# Patient Record
Sex: FEMALE | Race: WHITE | Employment: FULL TIME | URBAN - METROPOLITAN AREA
[De-identification: names, ages, dates, MRNs, and addresses within clinical notes are randomized per-mention and may not be internally consistent; named-entity substitution may affect disease eponyms.]

---

## 2019-11-25 ENCOUNTER — APPOINTMENT (OUTPATIENT)
Dept: CT IMAGING | Facility: CLINIC | Age: 54
End: 2019-11-25
Attending: FAMILY MEDICINE
Payer: COMMERCIAL

## 2019-11-25 ENCOUNTER — HOSPITAL ENCOUNTER (OUTPATIENT)
Facility: CLINIC | Age: 54
Setting detail: OBSERVATION
Discharge: OTHER INSTITUTION | End: 2019-11-26
Attending: FAMILY MEDICINE | Admitting: FAMILY MEDICINE
Payer: COMMERCIAL

## 2019-11-25 DIAGNOSIS — N13.2 HYDRONEPHROSIS WITH URINARY OBSTRUCTION DUE TO URETERAL CALCULUS: ICD-10-CM

## 2019-11-25 DIAGNOSIS — N23 RENAL COLIC: ICD-10-CM

## 2019-11-25 PROBLEM — E03.9 ACQUIRED HYPOTHYROIDISM: Status: ACTIVE | Noted: 2019-11-25

## 2019-11-25 PROBLEM — N20.1 LEFT URETERAL STONE: Status: ACTIVE | Noted: 2019-11-25

## 2019-11-25 PROBLEM — I10 BENIGN ESSENTIAL HYPERTENSION: Status: ACTIVE | Noted: 2019-11-25

## 2019-11-25 LAB
ALBUMIN UR-MCNC: NEGATIVE MG/DL
ANION GAP SERPL CALCULATED.3IONS-SCNC: 6 MMOL/L (ref 3–14)
APPEARANCE UR: ABNORMAL
BACTERIA #/AREA URNS HPF: ABNORMAL /HPF
BASOPHILS # BLD AUTO: 0.1 10E9/L (ref 0–0.2)
BASOPHILS NFR BLD AUTO: 0.3 %
BILIRUB UR QL STRIP: NEGATIVE
BUN SERPL-MCNC: 20 MG/DL (ref 7–30)
CALCIUM SERPL-MCNC: 9.3 MG/DL (ref 8.5–10.1)
CHLORIDE SERPL-SCNC: 103 MMOL/L (ref 94–109)
CO2 SERPL-SCNC: 30 MMOL/L (ref 20–32)
COLOR UR AUTO: YELLOW
CREAT SERPL-MCNC: 0.77 MG/DL (ref 0.52–1.04)
DIFFERENTIAL METHOD BLD: ABNORMAL
EOSINOPHIL NFR BLD AUTO: 3.6 %
ERYTHROCYTE [DISTWIDTH] IN BLOOD BY AUTOMATED COUNT: 13.3 % (ref 10–15)
GFR SERPL CREATININE-BSD FRML MDRD: 89 ML/MIN/{1.73_M2}
GLUCOSE SERPL-MCNC: 156 MG/DL (ref 70–99)
GLUCOSE UR STRIP-MCNC: NEGATIVE MG/DL
HCG UR QL: NEGATIVE
HCT VFR BLD AUTO: 39.8 % (ref 35–47)
HGB BLD-MCNC: 13.3 G/DL (ref 11.7–15.7)
HGB UR QL STRIP: ABNORMAL
IMM GRANULOCYTES # BLD: 0.1 10E9/L (ref 0–0.4)
IMM GRANULOCYTES NFR BLD: 0.5 %
KETONES UR STRIP-MCNC: NEGATIVE MG/DL
LEUKOCYTE ESTERASE UR QL STRIP: NEGATIVE
LYMPHOCYTES # BLD AUTO: 3.4 10E9/L (ref 0.8–5.3)
LYMPHOCYTES NFR BLD AUTO: 22.2 %
MCH RBC QN AUTO: 27.8 PG (ref 26.5–33)
MCHC RBC AUTO-ENTMCNC: 33.4 G/DL (ref 31.5–36.5)
MCV RBC AUTO: 83 FL (ref 78–100)
MONOCYTES # BLD AUTO: 1 10E9/L (ref 0–1.3)
MONOCYTES NFR BLD AUTO: 6.7 %
MUCOUS THREADS #/AREA URNS LPF: PRESENT /LPF
NEUTROPHILS # BLD AUTO: 10.2 10E9/L (ref 1.6–8.3)
NEUTROPHILS NFR BLD AUTO: 66.7 %
NITRATE UR QL: NEGATIVE
NRBC # BLD AUTO: 0 10*3/UL
NRBC BLD AUTO-RTO: 0 /100
PH UR STRIP: 5 PH (ref 5–7)
PLATELET # BLD AUTO: 340 10E9/L (ref 150–450)
POTASSIUM SERPL-SCNC: 3.4 MMOL/L (ref 3.4–5.3)
RBC # BLD AUTO: 4.79 10E12/L (ref 3.8–5.2)
RBC #/AREA URNS AUTO: >182 /HPF (ref 0–2)
SODIUM SERPL-SCNC: 139 MMOL/L (ref 133–144)
SOURCE: ABNORMAL
SP GR UR STRIP: 1.03 (ref 1–1.03)
SQUAMOUS #/AREA URNS AUTO: 10 /HPF (ref 0–1)
UROBILINOGEN UR STRIP-MCNC: 0 MG/DL (ref 0–2)
WBC # BLD AUTO: 15.3 10E9/L (ref 4–11)
WBC #/AREA URNS AUTO: 8 /HPF (ref 0–5)

## 2019-11-25 PROCEDURE — 25800030 ZZH RX IP 258 OP 636: Performed by: FAMILY MEDICINE

## 2019-11-25 PROCEDURE — 85025 COMPLETE CBC W/AUTO DIFF WBC: CPT | Performed by: FAMILY MEDICINE

## 2019-11-25 PROCEDURE — 96376 TX/PRO/DX INJ SAME DRUG ADON: CPT

## 2019-11-25 PROCEDURE — 96375 TX/PRO/DX INJ NEW DRUG ADDON: CPT

## 2019-11-25 PROCEDURE — 96361 HYDRATE IV INFUSION ADD-ON: CPT

## 2019-11-25 PROCEDURE — 99285 EMERGENCY DEPT VISIT HI MDM: CPT | Mod: 25

## 2019-11-25 PROCEDURE — 81001 URINALYSIS AUTO W/SCOPE: CPT | Performed by: FAMILY MEDICINE

## 2019-11-25 PROCEDURE — 25000128 H RX IP 250 OP 636: Performed by: FAMILY MEDICINE

## 2019-11-25 PROCEDURE — 99219 ZZC INITIAL OBSERVATION CARE,LEVL II: CPT | Performed by: FAMILY MEDICINE

## 2019-11-25 PROCEDURE — 74176 CT ABD & PELVIS W/O CONTRAST: CPT

## 2019-11-25 PROCEDURE — 99285 EMERGENCY DEPT VISIT HI MDM: CPT | Mod: 25 | Performed by: FAMILY MEDICINE

## 2019-11-25 PROCEDURE — 96365 THER/PROPH/DIAG IV INF INIT: CPT

## 2019-11-25 PROCEDURE — 80048 BASIC METABOLIC PNL TOTAL CA: CPT | Performed by: FAMILY MEDICINE

## 2019-11-25 PROCEDURE — 81025 URINE PREGNANCY TEST: CPT | Performed by: FAMILY MEDICINE

## 2019-11-25 PROCEDURE — 87086 URINE CULTURE/COLONY COUNT: CPT | Performed by: FAMILY MEDICINE

## 2019-11-25 RX ORDER — HYDROCHLOROTHIAZIDE 25 MG/1
25 TABLET ORAL
COMMUNITY
Start: 2019-09-13

## 2019-11-25 RX ORDER — KETOROLAC TROMETHAMINE 30 MG/ML
30 INJECTION, SOLUTION INTRAMUSCULAR; INTRAVENOUS ONCE
Status: COMPLETED | OUTPATIENT
Start: 2019-11-25 | End: 2019-11-25

## 2019-11-25 RX ORDER — HYDROMORPHONE HYDROCHLORIDE 1 MG/ML
0.5 INJECTION, SOLUTION INTRAMUSCULAR; INTRAVENOUS; SUBCUTANEOUS
Status: COMPLETED | OUTPATIENT
Start: 2019-11-25 | End: 2019-11-25

## 2019-11-25 RX ORDER — CEFTRIAXONE 1 G/1
1 INJECTION, POWDER, FOR SOLUTION INTRAMUSCULAR; INTRAVENOUS ONCE
Status: COMPLETED | OUTPATIENT
Start: 2019-11-25 | End: 2019-11-25

## 2019-11-25 RX ORDER — LEVOTHYROXINE SODIUM 125 UG/1
125 TABLET ORAL
COMMUNITY
Start: 2019-09-13

## 2019-11-25 RX ORDER — ONDANSETRON 2 MG/ML
4 INJECTION INTRAMUSCULAR; INTRAVENOUS ONCE
Status: COMPLETED | OUTPATIENT
Start: 2019-11-25 | End: 2019-11-25

## 2019-11-25 RX ORDER — VALACYCLOVIR HYDROCHLORIDE 1 G/1
TABLET, FILM COATED ORAL
COMMUNITY
Start: 2019-06-21

## 2019-11-25 RX ADMIN — CEFTRIAXONE SODIUM 1 G: 1 INJECTION, POWDER, FOR SOLUTION INTRAMUSCULAR; INTRAVENOUS at 23:32

## 2019-11-25 RX ADMIN — KETOROLAC TROMETHAMINE 30 MG: 30 INJECTION, SOLUTION INTRAMUSCULAR at 21:19

## 2019-11-25 RX ADMIN — HYDROMORPHONE HYDROCHLORIDE 0.5 MG: 1 INJECTION, SOLUTION INTRAMUSCULAR; INTRAVENOUS; SUBCUTANEOUS at 21:57

## 2019-11-25 RX ADMIN — ONDANSETRON 4 MG: 2 INJECTION INTRAMUSCULAR; INTRAVENOUS at 21:18

## 2019-11-25 RX ADMIN — SODIUM CHLORIDE 1000 ML: 9 INJECTION, SOLUTION INTRAVENOUS at 21:57

## 2019-11-25 RX ADMIN — HYDROMORPHONE HYDROCHLORIDE 0.5 MG: 1 INJECTION, SOLUTION INTRAMUSCULAR; INTRAVENOUS; SUBCUTANEOUS at 21:27

## 2019-11-25 RX ADMIN — HYDROMORPHONE HYDROCHLORIDE 0.5 MG: 1 INJECTION, SOLUTION INTRAMUSCULAR; INTRAVENOUS; SUBCUTANEOUS at 23:06

## 2019-11-26 ENCOUNTER — TELEPHONE (OUTPATIENT)
Dept: UROLOGY | Facility: CLINIC | Age: 54
End: 2019-11-26

## 2019-11-26 ENCOUNTER — PREP FOR PROCEDURE (OUTPATIENT)
Dept: UROLOGY | Facility: CLINIC | Age: 54
End: 2019-11-26

## 2019-11-26 VITALS
BODY MASS INDEX: 37.47 KG/M2 | SYSTOLIC BLOOD PRESSURE: 130 MMHG | RESPIRATION RATE: 18 BRPM | DIASTOLIC BLOOD PRESSURE: 50 MMHG | OXYGEN SATURATION: 95 % | HEIGHT: 64 IN | TEMPERATURE: 97.9 F | HEART RATE: 89 BPM | WEIGHT: 219.5 LBS

## 2019-11-26 DIAGNOSIS — N20.1 URETERAL STONE: Primary | ICD-10-CM

## 2019-11-26 LAB
ANION GAP SERPL CALCULATED.3IONS-SCNC: 4 MMOL/L (ref 3–14)
BUN SERPL-MCNC: 16 MG/DL (ref 7–30)
CALCIUM SERPL-MCNC: 8 MG/DL (ref 8.5–10.1)
CHLORIDE SERPL-SCNC: 103 MMOL/L (ref 94–109)
CO2 SERPL-SCNC: 29 MMOL/L (ref 20–32)
CREAT SERPL-MCNC: 0.86 MG/DL (ref 0.52–1.04)
ERYTHROCYTE [DISTWIDTH] IN BLOOD BY AUTOMATED COUNT: 13.4 % (ref 10–15)
GFR SERPL CREATININE-BSD FRML MDRD: 77 ML/MIN/{1.73_M2}
GLUCOSE SERPL-MCNC: 125 MG/DL (ref 70–99)
HCT VFR BLD AUTO: 39.2 % (ref 35–47)
HGB BLD-MCNC: 12.9 G/DL (ref 11.7–15.7)
INR PPP: 1 (ref 0.86–1.14)
MCH RBC QN AUTO: 27.9 PG (ref 26.5–33)
MCHC RBC AUTO-ENTMCNC: 32.9 G/DL (ref 31.5–36.5)
MCV RBC AUTO: 85 FL (ref 78–100)
PLATELET # BLD AUTO: 323 10E9/L (ref 150–450)
POTASSIUM SERPL-SCNC: 3.7 MMOL/L (ref 3.4–5.3)
RBC # BLD AUTO: 4.62 10E12/L (ref 3.8–5.2)
SODIUM SERPL-SCNC: 136 MMOL/L (ref 133–144)
WBC # BLD AUTO: 14.3 10E9/L (ref 4–11)

## 2019-11-26 PROCEDURE — 25800030 ZZH RX IP 258 OP 636: Performed by: FAMILY MEDICINE

## 2019-11-26 PROCEDURE — 25000128 H RX IP 250 OP 636: Performed by: FAMILY MEDICINE

## 2019-11-26 PROCEDURE — 96376 TX/PRO/DX INJ SAME DRUG ADON: CPT

## 2019-11-26 PROCEDURE — 99217 ZZC OBSERVATION CARE DISCHARGE: CPT | Performed by: FAMILY MEDICINE

## 2019-11-26 PROCEDURE — 36415 COLL VENOUS BLD VENIPUNCTURE: CPT | Performed by: FAMILY MEDICINE

## 2019-11-26 PROCEDURE — 85610 PROTHROMBIN TIME: CPT | Performed by: FAMILY MEDICINE

## 2019-11-26 PROCEDURE — G0378 HOSPITAL OBSERVATION PER HR: HCPCS

## 2019-11-26 PROCEDURE — 85027 COMPLETE CBC AUTOMATED: CPT | Performed by: FAMILY MEDICINE

## 2019-11-26 PROCEDURE — 96375 TX/PRO/DX INJ NEW DRUG ADDON: CPT

## 2019-11-26 PROCEDURE — 80048 BASIC METABOLIC PNL TOTAL CA: CPT | Performed by: FAMILY MEDICINE

## 2019-11-26 RX ORDER — KETOROLAC TROMETHAMINE 30 MG/ML
30 INJECTION, SOLUTION INTRAMUSCULAR; INTRAVENOUS EVERY 6 HOURS PRN
Status: DISCONTINUED | OUTPATIENT
Start: 2019-11-26 | End: 2019-11-26 | Stop reason: HOSPADM

## 2019-11-26 RX ORDER — ACETAMINOPHEN 650 MG/1
650 SUPPOSITORY RECTAL EVERY 4 HOURS PRN
Status: DISCONTINUED | OUTPATIENT
Start: 2019-11-26 | End: 2019-11-26 | Stop reason: HOSPADM

## 2019-11-26 RX ORDER — LIDOCAINE 40 MG/G
CREAM TOPICAL
Status: DISCONTINUED | OUTPATIENT
Start: 2019-11-26 | End: 2019-11-26 | Stop reason: HOSPADM

## 2019-11-26 RX ORDER — PROCHLORPERAZINE 25 MG
25 SUPPOSITORY, RECTAL RECTAL EVERY 12 HOURS PRN
Status: DISCONTINUED | OUTPATIENT
Start: 2019-11-26 | End: 2019-11-26 | Stop reason: HOSPADM

## 2019-11-26 RX ORDER — ONDANSETRON 2 MG/ML
4 INJECTION INTRAMUSCULAR; INTRAVENOUS EVERY 6 HOURS PRN
Status: DISCONTINUED | OUTPATIENT
Start: 2019-11-26 | End: 2019-11-26 | Stop reason: HOSPADM

## 2019-11-26 RX ORDER — HYDROMORPHONE HYDROCHLORIDE 1 MG/ML
0.3 INJECTION, SOLUTION INTRAMUSCULAR; INTRAVENOUS; SUBCUTANEOUS
Status: DISCONTINUED | OUTPATIENT
Start: 2019-11-26 | End: 2019-11-26 | Stop reason: HOSPADM

## 2019-11-26 RX ORDER — CEFAZOLIN SODIUM 1 G
1 VIAL (EA) INJECTION SEE ADMIN INSTRUCTIONS
Status: CANCELLED | OUTPATIENT
Start: 2019-11-26

## 2019-11-26 RX ORDER — OXYCODONE HYDROCHLORIDE 5 MG/1
5-10 TABLET ORAL
Status: DISCONTINUED | OUTPATIENT
Start: 2019-11-26 | End: 2019-11-26 | Stop reason: HOSPADM

## 2019-11-26 RX ORDER — SODIUM CHLORIDE 9 MG/ML
INJECTION, SOLUTION INTRAVENOUS CONTINUOUS
Status: DISCONTINUED | OUTPATIENT
Start: 2019-11-26 | End: 2019-11-26 | Stop reason: HOSPADM

## 2019-11-26 RX ORDER — ONDANSETRON 4 MG/1
4 TABLET, ORALLY DISINTEGRATING ORAL EVERY 6 HOURS PRN
Status: DISCONTINUED | OUTPATIENT
Start: 2019-11-26 | End: 2019-11-26 | Stop reason: HOSPADM

## 2019-11-26 RX ORDER — PROCHLORPERAZINE MALEATE 5 MG
10 TABLET ORAL EVERY 6 HOURS PRN
Status: DISCONTINUED | OUTPATIENT
Start: 2019-11-26 | End: 2019-11-26 | Stop reason: HOSPADM

## 2019-11-26 RX ORDER — ACETAMINOPHEN 325 MG/1
650 TABLET ORAL EVERY 4 HOURS PRN
Status: DISCONTINUED | OUTPATIENT
Start: 2019-11-26 | End: 2019-11-26 | Stop reason: HOSPADM

## 2019-11-26 RX ORDER — NALOXONE HYDROCHLORIDE 0.4 MG/ML
.1-.4 INJECTION, SOLUTION INTRAMUSCULAR; INTRAVENOUS; SUBCUTANEOUS
Status: DISCONTINUED | OUTPATIENT
Start: 2019-11-26 | End: 2019-11-26 | Stop reason: HOSPADM

## 2019-11-26 RX ADMIN — PROCHLORPERAZINE EDISYLATE 10 MG: 5 INJECTION INTRAMUSCULAR; INTRAVENOUS at 06:52

## 2019-11-26 RX ADMIN — HYDROMORPHONE HYDROCHLORIDE 0.3 MG: 1 INJECTION, SOLUTION INTRAMUSCULAR; INTRAVENOUS; SUBCUTANEOUS at 09:06

## 2019-11-26 RX ADMIN — HYDROMORPHONE HYDROCHLORIDE 0.3 MG: 1 INJECTION, SOLUTION INTRAMUSCULAR; INTRAVENOUS; SUBCUTANEOUS at 02:10

## 2019-11-26 RX ADMIN — ONDANSETRON 4 MG: 2 INJECTION INTRAMUSCULAR; INTRAVENOUS at 02:15

## 2019-11-26 RX ADMIN — KETOROLAC TROMETHAMINE 30 MG: 30 INJECTION, SOLUTION INTRAMUSCULAR at 05:19

## 2019-11-26 RX ADMIN — SODIUM CHLORIDE: 9 INJECTION, SOLUTION INTRAVENOUS at 01:10

## 2019-11-26 RX ADMIN — HYDROMORPHONE HYDROCHLORIDE 0.3 MG: 1 INJECTION, SOLUTION INTRAMUSCULAR; INTRAVENOUS; SUBCUTANEOUS at 06:48

## 2019-11-26 ASSESSMENT — MIFFLIN-ST. JEOR: SCORE: 1590.65

## 2019-11-26 NOTE — PLAN OF CARE
S-(situation): Patient discharged to [facility] via car with son.    B-(background): Observation goals met patient has had some relief with prn Dilaudid.    A-(assessment): Continues with left sided flank pain from kidney stone. Plan to transfer to [facility] for procedure.    R-(recommendations): Discharge instructions reviewed with patient. Listed belongings gathered and returned to patient.  Patient Education resolved: Yes  New medications-Pt. Has been educated about reason of use and side effects NA  Home and hospital acquired medications returned to patient NA  Medication Bin checked and emptied on discharge Yes

## 2019-11-26 NOTE — PROGRESS NOTES
S-(situation): Patient registered to Observation. Patient arrived to room 256 via wheelchair from ED.    B-(background): Kidney stones    A-(assessment): VSS, pain tolerable 2/10, a/o, LS clear.    R-(recommendations): Orders and observation goals reviewed with pt    Nursing Observation criteria listed below was met:    Skin issues/needs documented:NA  Isolation needs addressed, if appropriate: NA  Fall Prevention: Education given and documented: Yes  Education Assessment documented:Yes  Education Documented (Pre-existing chronic infection such as, MRSA/VRE need education on admission): Yes  OBS video/handout Reviewed & DocumentedYes  Medication Reconciliation Complete: Yes  New medication patient education completed and documented (Possible Side Effects of Common Medications handout): Yes  Home medications if not able to send immediately home with family stored here: NA  Reminder note placed in discharge instructions: NA  Patient has discharge needs (If yes, please explain): No

## 2019-11-26 NOTE — H&P
[facility]    History and Physical - Hospitalist Service       Date of Admission:  11/25/2019    Assessment & Plan   Nicolasa Zarate is a 54 year old female admitted on 11/25/2019. She presents with worsening left flank pain that started this afternoon.  Is been associate with nausea and vomiting.  No previous history of renal or kidney stones.  In the emergency department CT imaging is showing a 0.6 cm left proximal ureteral stone with moderate hydronephrosis.  This is been discussed with the on-call urologist, Dr. Avalos.  He would like her registered observation and monitor overnight for pain control.  If pain is controlled by a.m., can be discharged home on pain management, otherwise if still symptomatic, he will make arrangements for surgical intervention tomorrow afternoon at [facility].  He is to be notified in the a.m. if this needs to be done.  Meantime will be registered observation with IV fluids, pain management.  Patient is medically cleared for surgery and anesthesia.  Her son will be able to transport her to the other hospital if necessary    Left ureteral stone  Onset of urinary symptoms this morning with worsening left flank pain starting this afternoon  CT imaging showing a 0.6 cm left proximal ureteral stone with moderate obstruction  Case has been discussed with urology, Dr. Avalos,with the recommendation of observation stay, pain control  He wants antibiotics given and she was given IV Rocephin.  Will likely need to be discharged tomorrow with follow-up with urology later tomorrow for operative procedure  If pain controlled, can be discharged home tomorrow with outpatient follow-up.    Benign essential hypertension  Controlled at home taking hydrochlorothiazide  Hold for now    Acquired hypothyroidism  Taking daily Synthroid replacement  Restart at discharge         Diet: Advance as tolerated  DVT Prophylaxis: Low Risk/Ambulatory with no VTE prophylaxis  indicated  Jhaveri Catheter: not present  Code Status: Full code    Disposition Plan   Expected discharge: Tomorrow, recommended to prior living arrangement once adequate pain management/ tolerating PO medications and Plan for removal of stone determined.  Entered: Adrian Singletary MD 11/26/2019, 12:24 AM     The patient's care was discussed with the Patient.    Adrian Singletary MD  [facility]    ______________________________________________________________________    Chief Complaint   54-year-old female with increasing left flank pain    History is obtained from the patient, electronic health record and emergency department physician    History of Present Illness   Nicolasa Zarate is a 54 year old female who presents from home with increasing left flank pain.  Symptoms started in the morning with some urinary frequency and hesitancy which she assumed was a possible urinary tract infection by the afternoon was having increasing left flank pain associate with nausea and vomiting.  The pain is steadily worsened.  She denies any dysuria, no blood in the urine.  She denies any previous history of kidney stones.  Past history significant for hypertension taking daily hydrochlorothiazide and is taking daily Synthroid.  She denies any cough, shortness of breath, chest pain, fever, chills, sweats.    Review of Systems    The 10 point Review of Systems is negative other than noted in the HPI or here.     Past Medical History    I have reviewed this patient's medical history and updated it with pertinent information if needed.   Past Medical History:   Diagnosis Date     Hypertension        Past Surgical History   I have reviewed this patient's surgical history and updated it with pertinent information if needed.  Past Surgical History:   Procedure Laterality Date     COLONOSCOPY         Social History   I have reviewed this patient's social history and updated it with pertinent  information if needed.  Social History     Tobacco Use     Smoking status: Never Smoker     Smokeless tobacco: Never Used   Substance Use Topics     Alcohol use: Yes     Comment: socially     Drug use: Never       Family History   I have reviewed this patient's family history and updated it with pertinent information if needed.   Family History   Problem Relation Age of Onset     Diabetes Mother      Cerebrovascular Disease Mother         hx of stroke     Hypertension Mother      Prostate Cancer Father      Endometrial Cancer Maternal Grandmother        Prior to Admission Medications   Prior to Admission Medications   Prescriptions Last Dose Informant Patient Reported? Taking?   hydrochlorothiazide (HYDRODIURIL) 25 MG tablet 11/25/2019 at Unknown time  Yes Yes   Sig: Take 25 mg by mouth   levothyroxine (SYNTHROID/LEVOTHROID) 125 MCG tablet 11/25/2019 at Unknown time  Yes Yes   Sig: Take 125 mcg by mouth   valACYclovir (VALTREX) 1000 mg tablet More than a month at Unknown time  Yes No   Sig: TAKE ONE TABLET BY MOUTH TWICE DAILY FOR 3 DAYS FOR FLARES.      Facility-Administered Medications: None     Allergies   No Known Allergies    Physical Exam   Vital Signs: Temp: 97.8  F (36.6  C) Temp src: Oral BP: (!) 164/89 Pulse: 91   Resp: 18 SpO2: 98 % O2 Device: None (Room air)    Weight: 225 lbs 0 oz    Constitutional: awake, alert, cooperative, mild distress and appears stated age  Eyes: Lids and lashes normal, pupils equal, round and reactive to light, extra ocular muscles intact, sclera clear, conjunctiva normal  ENT: Normocephalic, without obvious abnormality, atraumatic, sinuses nontender on palpation, external ears without lesions, oral pharynx with moist mucous membranes, tonsils without erythema or exudates, gums normal and good dentition.  Hematologic / Lymphatic: no cervical lymphadenopathy and no supraclavicular lymphadenopathy  Respiratory: No increased work of breathing, good air exchange, clear to  auscultation bilaterally, no crackles or wheezing  Cardiovascular: regular rate and rhythm  GI: normal bowel sounds, soft, non-distended and non-tender  Skin: no bruising or bleeding, normal skin color, texture, turgor and no redness, warmth, or swelling  Musculoskeletal: There is no redness, warmth, or swelling of the joints.  Full range of motion noted.  Motor strength is 5 out of 5 all extremities bilaterally.  Tone is normal.  Neurologic: Awake, alert, oriented to name, place and time.  Cranial nerves II-XII are grossly intact.  Motor is 5 out of 5 bilaterally.  Cerebellar finger to nose, heel to shin intact.  Sensory is intact.  Babinski down going, Romberg negative, and gait is normal.    Data   Data reviewed today: I reviewed all medications, new labs and imaging results over the last 24 hours. I personally reviewed the abdominal CT image(s) showing 6 mm left ureteral stone which is proximal..    Results for orders placed or performed during the hospital encounter of 11/25/19 (from the past 24 hour(s))   UA reflex to Microscopic   Result Value Ref Range    Color Urine Yellow     Appearance Urine Slightly Cloudy     Glucose Urine Negative NEG^Negative mg/dL    Bilirubin Urine Negative NEG^Negative    Ketones Urine Negative NEG^Negative mg/dL    Specific Gravity Urine 1.030 1.003 - 1.035    Blood Urine Large (A) NEG^Negative    pH Urine 5.0 5.0 - 7.0 pH    Protein Albumin Urine Negative NEG^Negative mg/dL    Urobilinogen mg/dL 0.0 0.0 - 2.0 mg/dL    Nitrite Urine Negative NEG^Negative    Leukocyte Esterase Urine Negative NEG^Negative    Source Midstream Urine     RBC Urine >182 (H) 0 - 2 /HPF    WBC Urine 8 (H) 0 - 5 /HPF    Bacteria Urine Few (A) NEG^Negative /HPF    Squamous Epithelial /HPF Urine 10 (H) 0 - 1 /HPF    Mucous Urine Present (A) NEG^Negative /LPF   CBC with platelets differential   Result Value Ref Range    WBC 15.3 (H) 4.0 - 11.0 10e9/L    RBC Count 4.79 3.8 - 5.2 10e12/L    Hemoglobin 13.3 11.7  - 15.7 g/dL    Hematocrit 39.8 35.0 - 47.0 %    MCV 83 78 - 100 fl    MCH 27.8 26.5 - 33.0 pg    MCHC 33.4 31.5 - 36.5 g/dL    RDW 13.3 10.0 - 15.0 %    Platelet Count 340 150 - 450 10e9/L    Diff Method Automated Method     % Neutrophils 66.7 %    % Lymphocytes 22.2 %    % Monocytes 6.7 %    % Eosinophils 3.6 %    % Basophils 0.3 %    % Immature Granulocytes 0.5 %    Nucleated RBCs 0 0 /100    Absolute Neutrophil 10.2 (H) 1.6 - 8.3 10e9/L    Absolute Lymphocytes 3.4 0.8 - 5.3 10e9/L    Absolute Monocytes 1.0 0.0 - 1.3 10e9/L    Absolute Basophils 0.1 0.0 - 0.2 10e9/L    Abs Immature Granulocytes 0.1 0 - 0.4 10e9/L    Absolute Nucleated RBC 0.0    Basic metabolic panel   Result Value Ref Range    Sodium 139 133 - 144 mmol/L    Potassium 3.4 3.4 - 5.3 mmol/L    Chloride 103 94 - 109 mmol/L    Carbon Dioxide 30 20 - 32 mmol/L    Anion Gap 6 3 - 14 mmol/L    Glucose 156 (H) 70 - 99 mg/dL    Urea Nitrogen 20 7 - 30 mg/dL    Creatinine 0.77 0.52 - 1.04 mg/dL    GFR Estimate 89 >60 mL/min/[1.73_m2]    GFR Estimate If Black >90 >60 mL/min/[1.73_m2]    Calcium 9.3 8.5 - 10.1 mg/dL   HCG qualitative urine (UPT)   Result Value Ref Range    HCG Qual Urine Negative NEG^Negative   Abd/pelvis CT - no contrast - Stone Protocol    Narrative    EXAM: CT ABDOMEN AND PELVIS WITHOUT CONTRAST - RENAL STONE PROTOCOL  LOCATION: [facility]  DATE/TIME: 11/25/2019 10:00 PM    INDICATION: Left flank pain.  COMPARISON: None.    TECHNIQUE: CT scan of the abdomen and pelvis was performed without oral or IV contrast. Multiplanar reformats were obtained. Dose reduction techniques were used.  CONTRAST: None.    FINDINGS:    LOWER CHEST: Unremarkable.    HEPATOBILIARY: Mild diffuse fatty infiltration of the liver.    SPLEEN: Unremarkable.    PANCREAS: Unremarkable.    ADRENAL GLANDS: Unremarkable.    KIDNEYS/BLADDER: 0.6 cm calculus in the proximal ureter. Moderate dilatation of the intrarenal collecting system. An additional 0.3 cm  radiodensity in the inferior left hemipelvis (series 2 image 80) is likely within the distal left ureter, approximately   2 cm proximal to the ureterovesicular junction. Tiny 0.2 cm nonobstructing calculus in the interpolar region of the left kidney. Mild left perinephric haziness. No right renal or ureteral calculi.    BOWEL: Tiny hiatal hernia. The appendix is not visualized.    LYMPH NODES: Unremarkable.    PELVIC ORGANS: Bilateral fallopian tube occlusion devices are in place.    VASCULATURE: Atherosclerotic calcification in the abdominal aorta.    OTHER: Tiny periumbilical hernia containing fat.      Impression    IMPRESSION:   1. 0.6 cm proximal left ureteral calculus, resulting in moderate obstruction. There is also a probable tiny 0.3 cm calculus in the distal ureter.  2. Tiny nonobstructing left renal calculus.

## 2019-11-26 NOTE — TELEPHONE ENCOUNTER
Type of surgery: ureteroscopy with holmium lithotripsy and stent placement CPT code 68889, 05577  Ureteral stone [N20.1]  - Primary   Location of surgery: [facility]  Date and time of surgery: 11-26-19  12:00pm  Surgeon: Dr Avalos  Pre-Op Appt Date: na-in hospital   Post-Op Appt Date: tbd   Packet sent out: No  Pre-cert/Authorization completed:  No prior auth required per HP grid and list.   Date: 11/26/2019    Sent to  and was received. 11/26/2019    Insurance valid 11/26/2019

## 2019-11-26 NOTE — ED PROVIDER NOTES
[facility] ED Provider Note   Patient: Nicolasa Zarate  MRN #:  [id number]  Date of Visit: November 25, 2019    CC:     Chief Complaint   Patient presents with     Flank Pain     HPI:  Nicolasa Zarate is a 54 year old female who presented to the emergency department for flank pain. Patient reports having left sided flank pain and urinary urgency that started earlier this morning. She states her symptoms have progressively gotten worse throughout the day. She reports most of her pain is left lower abdominal, however, she is having left sided abdominal pain that wraps around to her left flank. She also notes having back pain and pain with breathing in. She states around 1730 she started having cramps. She states he pain is at an 8/10. She reports having no recent LMP, possibly going through menopause and no chance of pregnancy. She denies any history of kidney stones. She denies any past abdominal surgeries. She took Tylenol around 1300 today with no relief.    Problem List:  Patient Active Problem List    Diagnosis Date Noted     Left ureteral stone 11/25/2019     Priority: Medium     Benign essential hypertension 11/25/2019     Priority: Medium     Acquired hypothyroidism 11/25/2019     Priority: Medium       Past Medical History:   Diagnosis Date     Hypertension        MEDS: No current outpatient medications on file.      ALLERGIES:  No Known Allergies    Past Surgical History:   Procedure Laterality Date     COLONOSCOPY         Social History     Tobacco Use     Smoking status: Never Smoker     Smokeless tobacco: Never Used   Substance Use Topics     Alcohol use: Yes     Comment: socially     Drug use: Never         Review of Systems   Except as noted in HPI, all other systems were reviewed and are negative    Physical Exam     Vitals were reviewed  Patient Vitals for the past 12 hrs:   BP Temp Temp src Pulse Resp SpO2 Height Weight  "  11/26/19 0041 (!) 158/68 96.9  F (36.1  C) Oral 91 18 97 % 1.626 m (5' 4\") 99.6 kg (219 lb 8 oz)   11/26/19 0032 (!) 146/78 98.1  F (36.7  C) Oral 88 18 98 % -- --   11/25/19 2255 (!) 164/89 -- -- 91 -- -- -- 102.1 kg (225 lb)   11/25/19 2026 (!) 177/105 97.8  F (36.6  C) Oral 71 18 98 % -- --     GENERAL APPEARANCE: Alert, severe distress due to pain exam: Patient has pale color and is slightly diaphoretic  FACE: normal facies  EYES: Pupils are equal  HENT: normal external exam  NECK: no adenopathy or asymmetry  RESP: normal respiratory effort; clear breath sounds bilaterally  CV: regular rate and rhythm; no significant murmurs, gallops or rubs  ABD: soft, left sided abdominal tenderness; no significant left flank tenderness; no rebound or guarding; bowel sounds are normal  MS: no gross deformities noted; normal muscle tone.  EXT: No calf tenderness or pitting edema  SKIN: no worrisome rash  NEURO: no facial droop; no focal deficits, speech is normal        Available Lab/Imaging Results     Results for orders placed or performed during the hospital encounter of 11/25/19 (from the past 24 hour(s))   UA reflex to Microscopic   Result Value Ref Range    Color Urine Yellow     Appearance Urine Slightly Cloudy     Glucose Urine Negative NEG^Negative mg/dL    Bilirubin Urine Negative NEG^Negative    Ketones Urine Negative NEG^Negative mg/dL    Specific Gravity Urine 1.030 1.003 - 1.035    Blood Urine Large (A) NEG^Negative    pH Urine 5.0 5.0 - 7.0 pH    Protein Albumin Urine Negative NEG^Negative mg/dL    Urobilinogen mg/dL 0.0 0.0 - 2.0 mg/dL    Nitrite Urine Negative NEG^Negative    Leukocyte Esterase Urine Negative NEG^Negative    Source Midstream Urine     RBC Urine >182 (H) 0 - 2 /HPF    WBC Urine 8 (H) 0 - 5 /HPF    Bacteria Urine Few (A) NEG^Negative /HPF    Squamous Epithelial /HPF Urine 10 (H) 0 - 1 /HPF    Mucous Urine Present (A) NEG^Negative /LPF   CBC with platelets differential   Result Value Ref Range "    WBC 15.3 (H) 4.0 - 11.0 10e9/L    RBC Count 4.79 3.8 - 5.2 10e12/L    Hemoglobin 13.3 11.7 - 15.7 g/dL    Hematocrit 39.8 35.0 - 47.0 %    MCV 83 78 - 100 fl    MCH 27.8 26.5 - 33.0 pg    MCHC 33.4 31.5 - 36.5 g/dL    RDW 13.3 10.0 - 15.0 %    Platelet Count 340 150 - 450 10e9/L    Diff Method Automated Method     % Neutrophils 66.7 %    % Lymphocytes 22.2 %    % Monocytes 6.7 %    % Eosinophils 3.6 %    % Basophils 0.3 %    % Immature Granulocytes 0.5 %    Nucleated RBCs 0 0 /100    Absolute Neutrophil 10.2 (H) 1.6 - 8.3 10e9/L    Absolute Lymphocytes 3.4 0.8 - 5.3 10e9/L    Absolute Monocytes 1.0 0.0 - 1.3 10e9/L    Absolute Basophils 0.1 0.0 - 0.2 10e9/L    Abs Immature Granulocytes 0.1 0 - 0.4 10e9/L    Absolute Nucleated RBC 0.0    Basic metabolic panel   Result Value Ref Range    Sodium 139 133 - 144 mmol/L    Potassium 3.4 3.4 - 5.3 mmol/L    Chloride 103 94 - 109 mmol/L    Carbon Dioxide 30 20 - 32 mmol/L    Anion Gap 6 3 - 14 mmol/L    Glucose 156 (H) 70 - 99 mg/dL    Urea Nitrogen 20 7 - 30 mg/dL    Creatinine 0.77 0.52 - 1.04 mg/dL    GFR Estimate 89 >60 mL/min/[1.73_m2]    GFR Estimate If Black >90 >60 mL/min/[1.73_m2]    Calcium 9.3 8.5 - 10.1 mg/dL   HCG qualitative urine (UPT)   Result Value Ref Range    HCG Qual Urine Negative NEG^Negative   Abd/pelvis CT - no contrast - Stone Protocol    Narrative    EXAM: CT ABDOMEN AND PELVIS WITHOUT CONTRAST - RENAL STONE PROTOCOL  LOCATION: [facility]  DATE/TIME: 11/25/2019 10:00 PM    INDICATION: Left flank pain.  COMPARISON: None.    TECHNIQUE: CT scan of the abdomen and pelvis was performed without oral or IV contrast. Multiplanar reformats were obtained. Dose reduction techniques were used.  CONTRAST: None.    FINDINGS:    LOWER CHEST: Unremarkable.    HEPATOBILIARY: Mild diffuse fatty infiltration of the liver.    SPLEEN: Unremarkable.    PANCREAS: Unremarkable.    ADRENAL GLANDS: Unremarkable.    KIDNEYS/BLADDER: 0.6 cm calculus in the  proximal ureter. Moderate dilatation of the intrarenal collecting system. An additional 0.3 cm radiodensity in the inferior left hemipelvis (series 2 image 80) is likely within the distal left ureter, approximately   2 cm proximal to the ureterovesicular junction. Tiny 0.2 cm nonobstructing calculus in the interpolar region of the left kidney. Mild left perinephric haziness. No right renal or ureteral calculi.    BOWEL: Tiny hiatal hernia. The appendix is not visualized.    LYMPH NODES: Unremarkable.    PELVIC ORGANS: Bilateral fallopian tube occlusion devices are in place.    VASCULATURE: Atherosclerotic calcification in the abdominal aorta.    OTHER: Tiny paraumbilical hernia containing fat.      Impression    IMPRESSION:   1. 0.6 cm proximal left ureteral calculus, resulting in moderate obstruction. There is also a probable tiny 0.3 cm calculus in the distal ureter.  2. Tiny nonobstructing left renal calculus.              Impression     Final diagnoses:   Hydronephrosis with urinary obstruction due to ureteral calculus   Renal colic         ED Course & Medical Decision Making   Nicolasa Zarate is a 54 year old female who presented to the emergency department with acute onset of left-sided flank and abdominal pain.  Symptoms started this morning with urinary urgency, with progressive pain both in the left flank and in the left lower abdominal region.  She reports that she had a severe episode of pain, accompanied by vomiting.  She had to pull over on the side of the road and throw up.  Her current pain levels rated 8/10.  She has no prior history of kidney stones, but has had urinary tract infections, both bladder and kidney.  Patient was seen shortly after arrival.  Patient appeared in moderate to severe distress due to pain.  Temperature is 97.8, blood pressure 177/105, heart rate of 71, respiratory rate of 18 with 90% oxygen saturation.  Patient had left-sided abdominal and flank pain but no reproducible  pain with mild CVA palpation.  She does have some left lower quadrant tenderness without rebound or guarding.  Laboratory work-up reveals a elevated white blood count of 15.3 with 66% neutrophils.  Basic metabolic panel revealed a glucose of 156, BUN of 20 and creatinine of 0.77.  Electrolytes are normal.  Urinalysis reveals greater than 182 red blood cells, 8 white blood cells with few bacteria.  CT scan of the abdomen reveals a 6 mm proximal left ureteral calculus resulting in moderate obstruction.  There is also a probable tiny 0.3 cm calculus in the distal ureter.  There is tiny nonobstructing left renal calculus.  Patient received IV fluids, Toradol, Zofran, and Dilaudid for pain.  Her pain was still not well controlled.  I discussed the case with Dr. Joel Avalos.  He recommended that we hospitalize the patient for pain control overnight.  Contact him Tuesday morning to to provide an update.  If the patient's pain is controlled, she may be able to go home and follow-up on an outpatient basis.  Otherwise if she is still having a fair amount of symptoms, he will try to arrange for urologic procedure at [place] later in the day.  I conveyed my communication with Dr. Avalos to Dr. Singletary.  Patient was in agreement with this plan as well.           ED to Inpatient Handoff:    Discussed with Dr. Singletary  Patient accepted for Observation Stay  Pending studies include none  Code Status: Full Code                   This document serves as a record of services personally performed by Walker Addison MD. It was created on their behalf by Patricia Leonard, a trained medical scribe. The creation of this record is based on the provider's personal observations and the statements of the patient. This document has been checked and approved by the attending provider.       Disclaimer: This note consists of words and symbols derived from keyboarding and dictation using voice recognition software.  As a result, there may be errors that have  gone undetected.  Please consider this when interpreting information found in this note.      Molly Addison MD  11/26/19 1079

## 2019-11-26 NOTE — PLAN OF CARE
Pt a/o, c/o of left abdomen/flank pain, VSS, afebrile, LS clear, Toradol and dilaudid prn given for pain control.

## 2019-11-26 NOTE — PROGRESS NOTES
Patient discharged to [place] via car with son.  PIV remains intact on the right hand for use during the procedure.

## 2019-11-26 NOTE — ASSESSMENT & PLAN NOTE
Onset of urinary symptoms this morning with worsening left flank pain starting this afternoon  CT imaging showing a 0.6 cm left proximal ureteral stone with moderate obstruction  Case has been discussed with urology, Dr. Avalos,with the recommendation of observation stay, pain control  He wants antibiotics given and she was given IV Rocephin.  Will likely need to be discharged tomorrow with follow-up with urology later tomorrow for operative procedure  If pain controlled, can be discharged home tomorrow with outpatient follow-up.

## 2019-11-26 NOTE — DISCHARGE SUMMARY
[facility]  Hospitalist Discharge Summary       Date of Admission:  11/25/2019  Date of Discharge:  11/26/2019  Discharging Provider: Michell Gross MD  Discharge Diagnoses   Principal Problem:    Left ureteral stone  Active Problems:    Benign essential hypertension    Acquired hypothyroidism    Follow-ups Needed After Discharge   Follow-up Appointments     Follow Up and recommended labs and tests      Proceed directly to [facility] for your meeting and procedure   with Dr. Avalos           Unresulted Labs Ordered in the Past 30 Days of this Admission     Date and Time Order Name Status Description    11/25/2019 2040 Urine Culture In process       These results will be followed up by Dr. Avalos, urology    Discharge Disposition   Discharge patient and family will bring patient directly to [facility] for outpatient procedure with Dr. Avalos to be performed  Condition at discharge: Stable    Hospital Course   Nicolasa Zarate is a 54 year old female admitted on 11/25/2019. She presents with worsening left flank pain that started the day of admission and was associate with nausea and vomiting.  No previous history of renal or kidney stones.  In the emergency department CT imaging is showing a 0.6 cm left proximal ureteral stone with moderate hydronephrosis.  Following discussion with the on-call urologist, Dr. Avalos, patient was registered to observation and monitor overnight for pain control in hopes of achieving adequate pain control with PO medication which would allow outpatient follow up, however if pain was not able to be well controlled, would proceed with arrangements for surgical intervention at [facility] where he is performing procedures on 11/26/19.  Patient unfortunately continues to struggle with severe pain control and requires frequent IV Dilaudid to maintain tolerable pain control.  The pain has not changed is location or quality  since admission.  Discussed with Dr. Avalos again on 11/26/19 and he will be arranging appropriate surgical intervention at [facility] for this afternoon. Patient is medically cleared for surgery and anesthesia.  Her son will be able to transport her to the other hospital and home following the procedure.  She will remain here until the timing of the outpatient procedure is known, and then discharged with enough time to get to [facility] for that procedure to be performed.     Left ureteral stone  Onset of urinary symptoms on morning of presentation with worsening left flank pain starting hours before her presentation to the ED  CT imaging showing a 0.6 cm left proximal ureteral stone with moderate obstruction  Rocephin x1 has been given, urine culture is pending  Patient was registered to observation status with hopes of improving her pain control and allowing outpatient follow-up, however patient continues to require IV pain medications and antiemetics.  Dr. Avalos, urologist, is planning on performing outpatient surgical intervention on the patient this afternoon at [facility].    Patient will discharge to [facility] to allow for this procedure to occur.     Benign essential hypertension  Controlled at home taking hydrochlorothiazide - slightly elevated during this observation stay secondary to pain.  hydrochlorothiazide has been held during this observation stay but anticipate she could restart tomorrow following her outpatient procedure being complete.      Acquired hypothyroidism  Taking daily Synthroid replacement  Restart at discharge    Consultations This Hospital Stay   UROLOGY IP CONSULT    Code Status   Full Code    Time Spent on this Encounter   I, Michell Gross MD, personally saw the patient today and spent greater than 30 minutes discharging this patient.       Michell Gross MD  [facility]  ______________________________________________________________________    Physical Exam   Vital Signs: Temp: 97.9  F (36.6  C) Temp src: Oral BP: 130/50 Pulse: 89   Resp: 18 SpO2: 95 % O2 Device: None (Room air)    Weight: 219 lbs 8 oz  Constitutional: awake, alert, cooperative, no apparent distress at rest but patient received medication approximately an hour ago, and appears stated age  Respiratory: No increased work of breathing, good air exchange, clear to auscultation bilaterally, no crackles or wheezing  Cardiovascular: Normal apical impulse, regular rate and rhythm, normal S1 and S2, no S3 or S4, and no murmur noted  GI: bowel sounds present, abdomen soft, no significant tenderness on palpation of her abdomen - states the pain is deeper  Skin: no redness, warmth, or swelling and no rashes  Musculoskeletal: no lower extremity pitting edema present  Neurologic: Awake, alert, oriented to name, place and time.         Primary Care Physician   Luisa Ca    Discharge Orders      Reason for your hospital stay    Left sided kidney stone that is not passing on it's own.  You have been given extra fluids and pain medications overnight in hopes that the stone would start shifting and moving downward with signs of passing on it's own, but unfortunately that has not happened.  You are now going down to [facility] to see Dr. Avalos, urologist, and have an outpatient procedure performed to help the stone pass.     Follow Up and recommended labs and tests    Proceed directly to [facility] for your meeting and procedure with Dr. Avalos     Activity    Your activity upon discharge: activity as tolerated     Advance Diet as Tolerated    Follow this diet upon discharge: Nothing by mouth between now and your procedure       Significant Results and Procedures   Results for orders placed or performed during the hospital encounter of 11/25/19   Abd/pelvis CT - no contrast - Stone Protocol     Narrative    EXAM: CT ABDOMEN AND PELVIS WITHOUT CONTRAST - RENAL STONE PROTOCOL  LOCATION: [facility]  DATE/TIME: 11/25/2019 10:00 PM    INDICATION: Left flank pain.  COMPARISON: None.    TECHNIQUE: CT scan of the abdomen and pelvis was performed without oral or IV contrast. Multiplanar reformats were obtained. Dose reduction techniques were used.  CONTRAST: None.    FINDINGS:    LOWER CHEST: Unremarkable.    HEPATOBILIARY: Mild diffuse fatty infiltration of the liver.    SPLEEN: Unremarkable.    PANCREAS: Unremarkable.    ADRENAL GLANDS: Unremarkable.    KIDNEYS/BLADDER: 0.6 cm calculus in the proximal ureter. Moderate dilatation of the intrarenal collecting system. An additional 0.3 cm radiodensity in the inferior left hemipelvis (series 2 image 80) is likely within the distal left ureter, approximately   2 cm proximal to the ureterovesicular junction. Tiny 0.2 cm nonobstructing calculus in the interpolar region of the left kidney. Mild left perinephric haziness. No right renal or ureteral calculi.    BOWEL: Tiny hiatal hernia. The appendix is not visualized.    LYMPH NODES: Unremarkable.    PELVIC ORGANS: Bilateral fallopian tube occlusion devices are in place.    VASCULATURE: Atherosclerotic calcification in the abdominal aorta.    OTHER: Tiny paraumbilical hernia containing fat.      Impression    IMPRESSION:   1. 0.6 cm proximal left ureteral calculus, resulting in moderate obstruction. There is also a probable tiny 0.3 cm calculus in the distal ureter.  2. Tiny nonobstructing left renal calculus.       Discharge Medications   Current Discharge Medication List      CONTINUE these medications which have NOT CHANGED    Details   hydrochlorothiazide (HYDRODIURIL) 25 MG tablet Take 25 mg by mouth      levothyroxine (SYNTHROID/LEVOTHROID) 125 MCG tablet Take 125 mcg by mouth      valACYclovir (VALTREX) 1000 mg tablet TAKE ONE TABLET BY MOUTH TWICE DAILY FOR 3 DAYS FOR FLARES.           Allergies   No  Known Allergies

## 2019-11-26 NOTE — TELEPHONE ENCOUNTER
Reason for Call:  Other appointment    Detailed comments: Patient is scheduled for surgery 11-26-19. Need appointment for possible stent removal.    Phone Number Patient can be reached at: Home number on file [phone] (home)    Best Time: any    Can we leave a detailed message on this number? YES    Call taken on 11/26/2019 at 9:56 AM by Maida Mays

## 2019-11-26 NOTE — PROGRESS NOTES
SPIRITUAL HEALTH SERVICES  SPIRITUAL ASSESSMENT Progress Note  [facility]      During Rounding,  introduced himself to Annelise Zarate and informed her of his availability.    Ori Phan M.Div., [facility]  Staff   Office tel: [phone]

## 2019-11-27 ENCOUNTER — ANESTHESIA EVENT (OUTPATIENT)
Dept: SURGERY | Facility: AMBULATORY SURGERY CENTER | Age: 54
End: 2019-11-27

## 2019-11-27 ENCOUNTER — TELEPHONE (OUTPATIENT)
Dept: UROLOGY | Facility: CLINIC | Age: 54
End: 2019-11-27

## 2019-11-27 PROBLEM — N20.1 URETERAL STONE: Status: ACTIVE | Noted: 2019-11-27

## 2019-11-27 LAB
BACTERIA SPEC CULT: NORMAL
SPECIMEN SOURCE: NORMAL

## 2019-11-27 ASSESSMENT — MIFFLIN-ST. JEOR: SCORE: 1624

## 2019-11-27 NOTE — TELEPHONE ENCOUNTER
Reason for Call:  Other call back    Detailed comments: Patient was called to schedule procedure for lithotripsy. Patient returning call as she is confused as to what this procedure would be for as she has an appointment 12-4-19 for a stent removal in office. Please call patient to discuss. Thank you.    Phone Number Patient can be reached at: Home number on file [phone] (home)    Best Time: any     Can we leave a detailed message on this number? YES    Call taken on 11/27/2019 at 11:34 AM by Maida Mays

## 2019-11-27 NOTE — TELEPHONE ENCOUNTER
Called and spoke to patient.   Stent removal appointment cancelled.   Information provided to patient that additional surgery is required.   Patient agrees.   Elmira Alberts RN

## 2019-11-27 NOTE — TELEPHONE ENCOUNTER
Type of surgery: cystoureteroscopy,with lithotripsy using laser and ureteral stent insertion (left) CPT code 42101, 72811  Ureteral stone [N20.1]  - Primary   Location of surgery: [facility]  Date and time of surgery: 12-2-19   1:45pm  Surgeon: Dr Avalos  Pre-Op Appt Date: done in hospital  Post-Op Appt Date: TBD   Packet sent out: No  Pre-cert/Authorization completed:  No prior auth required per    Date: 11/27/2019    Insurance valid 12/02/2019

## 2019-11-27 NOTE — TELEPHONE ENCOUNTER
Reason for Call:  Other appointment    Detailed comments: Patient has been scheduled for a 2nd procedure 12-2-19 at the [facility]. May need appointment for stent removal. Please call patient to advise.    Phone Number Patient can be reached at: Home number on file [phone] (home)    Best Time: any    Can we leave a detailed message on this number? YES    Call taken on 11/27/2019 at 2:08 PM by Maida Mays

## 2019-12-02 ENCOUNTER — ANCILLARY PROCEDURE (OUTPATIENT)
Dept: GENERAL RADIOLOGY | Facility: CLINIC | Age: 54
End: 2019-12-02
Attending: UROLOGY
Payer: COMMERCIAL

## 2019-12-02 ENCOUNTER — ANESTHESIA (OUTPATIENT)
Dept: SURGERY | Facility: AMBULATORY SURGERY CENTER | Age: 54
End: 2019-12-02

## 2019-12-02 ENCOUNTER — HOSPITAL ENCOUNTER (OUTPATIENT)
Facility: AMBULATORY SURGERY CENTER | Age: 54
Discharge: HOME | End: 2019-12-02
Attending: UROLOGY | Admitting: UROLOGY
Payer: COMMERCIAL

## 2019-12-02 VITALS
RESPIRATION RATE: 18 BRPM | HEIGHT: 64 IN | BODY MASS INDEX: 38.73 KG/M2 | TEMPERATURE: 97 F | WEIGHT: 226.85 LBS | DIASTOLIC BLOOD PRESSURE: 100 MMHG | OXYGEN SATURATION: 99 % | SYSTOLIC BLOOD PRESSURE: 185 MMHG | HEART RATE: 71 BPM

## 2019-12-02 DIAGNOSIS — N20.0 RENAL CALCULI: ICD-10-CM

## 2019-12-02 DIAGNOSIS — N20.1 URETERAL STONE: ICD-10-CM

## 2019-12-02 PROCEDURE — 82365 CALCULUS SPECTROSCOPY: CPT | Performed by: PATHOLOGY

## 2019-12-02 PROCEDURE — G8918 PT W/O PREOP ORDER IV AB PRO: HCPCS

## 2019-12-02 PROCEDURE — G8916 PT W IV AB GIVEN ON TIME: HCPCS

## 2019-12-02 PROCEDURE — 99000 SPECIMEN HANDLING OFFICE-LAB: CPT | Performed by: UROLOGY

## 2019-12-02 PROCEDURE — 52356 CYSTO/URETERO W/LITHOTRIPSY: CPT | Mod: LT | Performed by: UROLOGY

## 2019-12-02 PROCEDURE — 52356 CYSTO/URETERO W/LITHOTRIPSY: CPT | Mod: LT

## 2019-12-02 PROCEDURE — G8907 PT DOC NO EVENTS ON DISCHARG: HCPCS

## 2019-12-02 DEVICE — IMPLANTABLE DEVICE: Type: IMPLANTABLE DEVICE | Site: URETER | Status: FUNCTIONAL

## 2019-12-02 RX ORDER — PROPOFOL 10 MG/ML
INJECTION, EMULSION INTRAVENOUS PRN
Status: DISCONTINUED | OUTPATIENT
Start: 2019-12-02 | End: 2019-12-02

## 2019-12-02 RX ORDER — ONDANSETRON 2 MG/ML
4 INJECTION INTRAMUSCULAR; INTRAVENOUS EVERY 30 MIN PRN
Status: DISCONTINUED | OUTPATIENT
Start: 2019-12-02 | End: 2019-12-03 | Stop reason: HOSPADM

## 2019-12-02 RX ORDER — NALOXONE HYDROCHLORIDE 0.4 MG/ML
.1-.4 INJECTION, SOLUTION INTRAMUSCULAR; INTRAVENOUS; SUBCUTANEOUS
Status: DISCONTINUED | OUTPATIENT
Start: 2019-12-02 | End: 2019-12-03 | Stop reason: HOSPADM

## 2019-12-02 RX ORDER — DEXAMETHASONE SODIUM PHOSPHATE 4 MG/ML
4 INJECTION, SOLUTION INTRA-ARTICULAR; INTRALESIONAL; INTRAMUSCULAR; INTRAVENOUS; SOFT TISSUE EVERY 10 MIN PRN
Status: DISCONTINUED | OUTPATIENT
Start: 2019-12-02 | End: 2019-12-03 | Stop reason: HOSPADM

## 2019-12-02 RX ORDER — CEFAZOLIN SODIUM 1 G/3ML
1 INJECTION, POWDER, FOR SOLUTION INTRAMUSCULAR; INTRAVENOUS SEE ADMIN INSTRUCTIONS
Status: DISCONTINUED | OUTPATIENT
Start: 2019-12-02 | End: 2019-12-03 | Stop reason: HOSPADM

## 2019-12-02 RX ORDER — ACETAMINOPHEN 325 MG/1
975 TABLET ORAL ONCE
Status: COMPLETED | OUTPATIENT
Start: 2019-12-02 | End: 2019-12-02

## 2019-12-02 RX ORDER — LIDOCAINE 40 MG/G
CREAM TOPICAL
Status: DISCONTINUED | OUTPATIENT
Start: 2019-12-02 | End: 2019-12-03 | Stop reason: HOSPADM

## 2019-12-02 RX ORDER — LIDOCAINE HYDROCHLORIDE 20 MG/ML
INJECTION, SOLUTION INFILTRATION; PERINEURAL PRN
Status: DISCONTINUED | OUTPATIENT
Start: 2019-12-02 | End: 2019-12-02

## 2019-12-02 RX ORDER — FENTANYL CITRATE 50 UG/ML
25-50 INJECTION, SOLUTION INTRAMUSCULAR; INTRAVENOUS
Status: DISCONTINUED | OUTPATIENT
Start: 2019-12-02 | End: 2019-12-03 | Stop reason: HOSPADM

## 2019-12-02 RX ORDER — FENTANYL CITRATE 50 UG/ML
INJECTION, SOLUTION INTRAMUSCULAR; INTRAVENOUS PRN
Status: DISCONTINUED | OUTPATIENT
Start: 2019-12-02 | End: 2019-12-02

## 2019-12-02 RX ORDER — SODIUM CHLORIDE, SODIUM LACTATE, POTASSIUM CHLORIDE, CALCIUM CHLORIDE 600; 310; 30; 20 MG/100ML; MG/100ML; MG/100ML; MG/100ML
INJECTION, SOLUTION INTRAVENOUS CONTINUOUS
Status: DISCONTINUED | OUTPATIENT
Start: 2019-12-02 | End: 2019-12-03 | Stop reason: HOSPADM

## 2019-12-02 RX ORDER — OXYCODONE HYDROCHLORIDE 5 MG/1
5-10 TABLET ORAL EVERY 4 HOURS PRN
Status: DISCONTINUED | OUTPATIENT
Start: 2019-12-02 | End: 2019-12-03 | Stop reason: HOSPADM

## 2019-12-02 RX ORDER — CEPHALEXIN 500 MG/1
500 CAPSULE ORAL 3 TIMES DAILY
Qty: 9 CAPSULE | Refills: 0 | Status: SHIPPED | OUTPATIENT
Start: 2019-12-02 | End: 2020-01-23

## 2019-12-02 RX ORDER — ONDANSETRON 2 MG/ML
INJECTION INTRAMUSCULAR; INTRAVENOUS PRN
Status: DISCONTINUED | OUTPATIENT
Start: 2019-12-02 | End: 2019-12-02

## 2019-12-02 RX ORDER — MEPERIDINE HYDROCHLORIDE 25 MG/ML
12.5 INJECTION INTRAMUSCULAR; INTRAVENOUS; SUBCUTANEOUS
Status: DISCONTINUED | OUTPATIENT
Start: 2019-12-02 | End: 2019-12-03 | Stop reason: HOSPADM

## 2019-12-02 RX ORDER — KETOROLAC TROMETHAMINE 30 MG/ML
30 INJECTION, SOLUTION INTRAMUSCULAR; INTRAVENOUS EVERY 6 HOURS PRN
Status: DISCONTINUED | OUTPATIENT
Start: 2019-12-02 | End: 2019-12-03 | Stop reason: HOSPADM

## 2019-12-02 RX ORDER — HYDROMORPHONE HYDROCHLORIDE 1 MG/ML
.3-.5 INJECTION, SOLUTION INTRAMUSCULAR; INTRAVENOUS; SUBCUTANEOUS EVERY 10 MIN PRN
Status: DISCONTINUED | OUTPATIENT
Start: 2019-12-02 | End: 2019-12-03 | Stop reason: HOSPADM

## 2019-12-02 RX ORDER — ONDANSETRON 4 MG/1
4 TABLET, ORALLY DISINTEGRATING ORAL EVERY 30 MIN PRN
Status: DISCONTINUED | OUTPATIENT
Start: 2019-12-02 | End: 2019-12-03 | Stop reason: HOSPADM

## 2019-12-02 RX ORDER — GABAPENTIN 300 MG/1
300 CAPSULE ORAL ONCE
Status: COMPLETED | OUTPATIENT
Start: 2019-12-02 | End: 2019-12-02

## 2019-12-02 RX ORDER — CEFAZOLIN SODIUM 2 G/100ML
2 INJECTION, SOLUTION INTRAVENOUS
Status: COMPLETED | OUTPATIENT
Start: 2019-12-02 | End: 2019-12-02

## 2019-12-02 RX ORDER — ALBUTEROL SULFATE 0.83 MG/ML
2.5 SOLUTION RESPIRATORY (INHALATION) EVERY 4 HOURS PRN
Status: DISCONTINUED | OUTPATIENT
Start: 2019-12-02 | End: 2019-12-03 | Stop reason: HOSPADM

## 2019-12-02 RX ADMIN — ACETAMINOPHEN 975 MG: 325 TABLET ORAL at 12:59

## 2019-12-02 RX ADMIN — CEFAZOLIN SODIUM 2 G: 2 INJECTION, SOLUTION INTRAVENOUS at 14:29

## 2019-12-02 RX ADMIN — LIDOCAINE HYDROCHLORIDE 60 MG: 20 INJECTION, SOLUTION INFILTRATION; PERINEURAL at 14:27

## 2019-12-02 RX ADMIN — FENTANYL CITRATE 25 MCG: 50 INJECTION, SOLUTION INTRAMUSCULAR; INTRAVENOUS at 14:43

## 2019-12-02 RX ADMIN — FENTANYL CITRATE 50 MCG: 50 INJECTION, SOLUTION INTRAMUSCULAR; INTRAVENOUS at 14:26

## 2019-12-02 RX ADMIN — PROPOFOL 200 MG: 10 INJECTION, EMULSION INTRAVENOUS at 14:27

## 2019-12-02 RX ADMIN — GABAPENTIN 300 MG: 300 CAPSULE ORAL at 12:59

## 2019-12-02 RX ADMIN — SODIUM CHLORIDE, SODIUM LACTATE, POTASSIUM CHLORIDE, CALCIUM CHLORIDE: 600; 310; 30; 20 INJECTION, SOLUTION INTRAVENOUS at 14:20

## 2019-12-02 RX ADMIN — OXYCODONE HYDROCHLORIDE 5 MG: 5 TABLET ORAL at 15:31

## 2019-12-02 RX ADMIN — ONDANSETRON 4 MG: 2 INJECTION INTRAMUSCULAR; INTRAVENOUS at 14:31

## 2019-12-02 NOTE — DISCHARGE INSTRUCTIONS
[facility]  Same-Day Surgery   Adult Discharge Orders & Instructions   For 24 hours after surgery  1. Get plenty of rest.  A responsible adult must stay with you for at least 24 hours after you leave the hospital.   2. Do not drive or use heavy equipment.  If you have weakness or tingling, don't drive or use heavy equipment until this feeling goes away.  3. Do not drink alcohol.  4. Avoid strenuous or risky activities.  Ask for help when climbing stairs.   5. You may feel lightheaded.  IF so, sit for a few minutes before standing.  Have someone help you get up.   6. If you have nausea (feel sick to your stomach): Drink only clear liquids such as apple juice, ginger ale, broth or 7-Up.  Rest may also help.  Be sure to drink enough fluids.  Move to a regular diet as you feel able.  7. You may have a slight fever. Call the doctor if your fever is over 100 F (37.7 C) (taken under the tongue) or lasts longer than 24 hours.  8. You may have a dry mouth, a sore throat, muscle aches or trouble sleeping.  These should go away after 24 hours.  9. Do not make important or legal decisions.   Call your doctor for any of the followin.  Signs of infection (fever, growing tenderness at the surgery site, a large amount of drainage or bleeding, severe pain, foul-smelling drainage, redness, swelling).    2. It has been over 8 to 10 hours since surgery and you are still not able to urinate (pass water).    3.  Headache for over 24 hours.      To contact Dr Avalos call:  [phone]    Tylenol was given at 1:00 pm.

## 2019-12-02 NOTE — ANESTHESIA CARE TRANSFER NOTE
Patient: Nicolasa Zarate    Procedure(s):  LEFT CYSTOURETEROSCOPY, WITH LITHOTRIPSY USING LASER AND URETERAL STENT REMOVAL AND INSERTION    Diagnosis: Ureteral stone [N20.1]  Diagnosis Additional Information: No value filed.    Anesthesia Type:   General     Note:  Airway :Nasal Cannula  Patient transferred to:PACU  Comments: Awake, comfortable, sats 99%< Report to RN.Handoff Report: Identifed the Patient, Identified the Reponsible Provider, Reviewed the pertinent medical history, Discussed the surgical course, Reviewed Intra-OP anesthesia mangement and issues during anesthesia, Set expectations for post-procedure period and Allowed opportunity for questions and acknowledgement of understanding      Vitals: (Last set prior to Anesthesia Care Transfer)    CRNA VITALS  12/2/2019 1439 - 12/2/2019 1509      12/2/2019             Resp Rate (observed):  (!) 2                Electronically Signed By: VALENTE Holley CRNA  December 2, 2019  3:09 PM

## 2019-12-02 NOTE — ANESTHESIA PREPROCEDURE EVALUATION
Anesthesia Pre-Procedure Evaluation    Patient: Nicolasa Zarate   MRN:     [id number] Gender:   female   Age:    54 year old :      1965        Preoperative Diagnosis: Ureteral stone [N20.1]   Procedure(s):  LEFT CYSTOURETEROSCOPY, WITH LITHOTRIPSY USING LASER AND URETERAL STENT INSERTION     Past Medical History:   Diagnosis Date     Hypertension       Past Surgical History:   Procedure Laterality Date     COLONOSCOPY            Anesthesia Evaluation     . Pt has had prior anesthetic. Type: MAC    No history of anesthetic complications          ROS/MED HX    ENT/Pulmonary:  - neg pulmonary ROS     Neurologic:  - neg neurologic ROS     Cardiovascular:     (+) hypertension----. : . . . :. .       METS/Exercise Tolerance:     Hematologic:  - neg hematologic  ROS       Musculoskeletal:  - neg musculoskeletal ROS       GI/Hepatic:  - neg GI/hepatic ROS       Renal/Genitourinary:  - ROS Renal section negative       Endo:     (+) thyroid problem hypothyroidism, .      Psychiatric:  - neg psychiatric ROS       Infectious Disease:  - neg infectious disease ROS       Malignancy:      - no malignancy   Other:    - neg other ROS                     PHYSICAL EXAM:   Mental Status/Neuro: A/A/O   Airway: Facies: Feasible  Mallampati: I  Mouth/Opening: Full  TM distance: > 6 cm  Neck ROM: Full   Respiratory: Auscultation: CTAB     Resp. Rate: Normal     Resp. Effort: Normal      CV: Rhythm: Regular  Rate: Age appropriate  Heart: Normal Sounds  Edema: None   Comments:      Dental: Normal Dentition                LABS:  CBC:   Lab Results   Component Value Date    WBC 14.3 (H) 2019    WBC 15.3 (H) 2019    HGB 12.9 2019    HGB 13.3 2019    HCT 39.2 2019    HCT 39.8 2019     2019     2019     BMP:   Lab Results   Component Value Date     2019     2019    POTASSIUM 3.7 2019    POTASSIUM 3.4 2019    CHLORIDE 103 2019     "CHLORIDE 103 11/25/2019    CO2 29 11/26/2019    CO2 30 11/25/2019    BUN 16 11/26/2019    BUN 20 11/25/2019    CR 0.86 11/26/2019    CR 0.77 11/25/2019     (H) 11/26/2019     (H) 11/25/2019     COAGS:   Lab Results   Component Value Date    INR 1.00 11/26/2019     POC:   Lab Results   Component Value Date    HCG Negative 11/25/2019     OTHER:   Lab Results   Component Value Date    [place] 8.0 (L) 11/26/2019        Preop Vitals    BP Readings from Last 3 Encounters:   12/02/19 (!) 146/94   11/26/19 130/50    Pulse Readings from Last 3 Encounters:   12/02/19 75   11/26/19 89      Resp Readings from Last 3 Encounters:   12/02/19 18   11/26/19 18    SpO2 Readings from Last 3 Encounters:   12/02/19 97%   11/26/19 95%      Temp Readings from Last 1 Encounters:   12/02/19 97.6  F (36.4  C) (Temporal)    Ht Readings from Last 1 Encounters:   11/27/19 1.626 m (5' 4\")      Wt Readings from Last 1 Encounters:   11/27/19 102.9 kg (226 lb 13.7 oz)    Estimated body mass index is 38.94 kg/m  as calculated from the following:    Height as of this encounter: 1.626 m (5' 4\").    Weight as of this encounter: 102.9 kg (226 lb 13.7 oz).     LDA:  Peripheral IV 11/25/19 Right Upper forearm (Active)   Number of days: 7       Peripheral IV 12/02/19 Right Hand (Active)   Site Assessment WDL 12/2/2019  1:09 PM   Line Status Infusing 12/2/2019  1:09 PM   Phlebitis Scale 0-->no symptoms 12/2/2019  1:09 PM   Infiltration Scale 0 12/2/2019  1:09 PM   Infiltration Site Treatment Method  None 12/2/2019  1:09 PM   Extravasation? No 12/2/2019  1:09 PM   Dressing Intervention New dressing  12/2/2019  1:09 PM   Number of days: 0       Airway - Adult/Peds laryngeal mask airway (Active)   Number of days: 0        Assessment:   ASA SCORE: 2    H&P: History and physical reviewed and following examination; no interval change.   Smoking Status:  Non-Smoker/Unknown   NPO Status: NPO Appropriate     Plan:   Anes. Type:  General   Pre-Medication: " None   Induction:  IV (Standard)   Airway: LMA   Access/Monitoring: PIV   Maintenance: Balanced     Postop Plan:   Postop Pain: Opioids  Postop Sedation/Airway: Not planned  Disposition: Outpatient     PONV Management:   Adult Risk Factors: Female, Non-Smoker, Postop Opioids   Prevention: Ondansetron, Dexamethasone     CONSENT: Direct conversation   Plan and risks discussed with: Patient   Blood Products: Consent Deferred (Minimal Blood Loss)                   Mario Rodríguez MD

## 2019-12-02 NOTE — BRIEF OP NOTE
[place]  Brief Operative Note    Pre-operative diagnosis: Left ureteral stones   Post-operative diagnosis same   Procedure: Procedure(s):  LEFT CYSTOURETEROSCOPY, WITH LITHOTRIPSY USING LASER AND URETERAL STENT REMOVAL AND INSERTION   Surgeon: Joel Avalos MD   Assistants(s): None   Anesthesia: General    Estimated blood loss: minimal                   Specimens: sent   Implants: None       Complications: None   Condition on discharge: Stable   Findings: 2 stones removed  See dictated operative report for full details

## 2019-12-02 NOTE — ANESTHESIA POSTPROCEDURE EVALUATION
Anesthesia POST Procedure Evaluation    Patient: Nicolasa Zarate   MRN:     [id number] Gender:   female   Age:    54 year old :      1965        Preoperative Diagnosis: Ureteral stone [N20.1]   Procedure(s):  LEFT CYSTOURETEROSCOPY, WITH LITHOTRIPSY USING LASER AND URETERAL STENT REMOVAL AND INSERTION   Postop Comments: No value filed.       Anesthesia Type:  Not documented  General    Reportable Event: NO     PAIN: Uncomplicated   Sign Out status: Comfortable, Well controlled pain     PONV: No PONV   Sign Out status:  No Nausea or Vomiting     Neuro/Psych: Uneventful perioperative course   Sign Out Status: Preoperative baseline; Age appropriate mentation     Airway/Resp.: Uneventful perioperative course   Sign Out Status: Non labored breathing, age appropriate RR; Resp. Status within EXPECTED Parameters     CV: Uneventful perioperative course   Sign Out status: Appropriate BP and perfusion indices; Appropriate HR/Rhythm     Disposition:   Sign Out in:  PACU  Disposition:  Phase II; Home  Recovery Course: Uneventful  Follow-Up: Not required           Last Anesthesia Record Vitals:  CRNA VITALS  2019 1439 - 2019 1539      2019             Resp Rate (observed):  (!) 2          Last PACU Vitals:  Vitals Value Taken Time   /101 2019  3:10 PM   Temp 96.9  F (36.1  C) 2019  3:05 PM   Pulse 78 2019  3:10 PM   Resp 12 2019  3:13 PM   SpO2 100 % 2019  3:13 PM   Temp src     NIBP     Pulse     SpO2     Resp     Temp     Ht Rate     Temp 2     Vitals shown include unvalidated device data.      Electronically Signed By: Mario Rodríguez MD, 2019, 3:51 PM

## 2019-12-02 NOTE — OP NOTE
Preop diagnosis: Left ureteral stones    Postop diagnosis: Same    Procedure done:    1.  Left ureteroscopy with holmium laser lithotripsy and stone basketing  2. cystoscopy and left stent exchange    Procedure    Patient brought to the open room and placed in a dorsolithotomy position after general anesthesia has been induced.  She was draped and prepped in usual surgical fashion.  Preop antibiotic was given.  A rigid cystoscope was placed into the bladder.  The left stent was identified and removed with a grasper.  A [employer] wire was introduced into the renal pelvis.  A semirigid ureteroscope then used next.  This was introduced into the left ureter where a 4 mm stone was identified and removed with a basket.  Scope was then advanced to the upper ureter where another stone was identified.  Using a holmium laser the stone was broken down pieces and removed with a tipless basket.  A new stent 6 [place] by 24 cm stent was then placed.  Patient tolerated procedure well.  No complications identified during the procedure.  There was minimal bleeding during the operation.

## 2019-12-05 LAB
APPEARANCE STONE: NORMAL
COMPN STONE: NORMAL
NUMBER STONE: NORMAL
SIZE STONE: NORMAL MM
WT STONE: 69 MG

## 2019-12-11 ENCOUNTER — ANCILLARY PROCEDURE (OUTPATIENT)
Dept: GENERAL RADIOLOGY | Facility: OTHER | Age: 54
End: 2019-12-11
Attending: UROLOGY
Payer: COMMERCIAL

## 2019-12-11 ENCOUNTER — OFFICE VISIT (OUTPATIENT)
Dept: UROLOGY | Facility: OTHER | Age: 54
End: 2019-12-11
Payer: COMMERCIAL

## 2019-12-11 VITALS
TEMPERATURE: 98.2 F | DIASTOLIC BLOOD PRESSURE: 84 MMHG | BODY MASS INDEX: 38.79 KG/M2 | SYSTOLIC BLOOD PRESSURE: 132 MMHG | HEART RATE: 76 BPM | WEIGHT: 226 LBS

## 2019-12-11 DIAGNOSIS — N20.0 RENAL CALCULI: Primary | ICD-10-CM

## 2019-12-11 DIAGNOSIS — N20.0 RENAL CALCULI: ICD-10-CM

## 2019-12-11 PROCEDURE — 52310 CYSTOSCOPY AND TREATMENT: CPT | Performed by: UROLOGY

## 2019-12-11 PROCEDURE — 74019 RADEX ABDOMEN 2 VIEWS: CPT

## 2019-12-11 NOTE — PROGRESS NOTES
Patient returns to clinic for cysto and stent removal.  she is s/p ureteroscopy recently.       Procedure: patient was brought to the cysto suite.  she was draped and prepped in the usual surgical fashion.  Cystoscopy placed. Stent removed without problems.    Recommendation:  Return to Clinic: urorisks next

## 2019-12-16 ENCOUNTER — TRANSFERRED RECORDS (OUTPATIENT)
Dept: HEALTH INFORMATION MANAGEMENT | Facility: CLINIC | Age: 54
End: 2019-12-16

## 2019-12-16 DIAGNOSIS — N20.0 RENAL CALCULI: ICD-10-CM

## 2019-12-16 PROCEDURE — 83986 ASSAY PH BODY FLUID NOS: CPT | Mod: 90 | Performed by: UROLOGY

## 2019-12-16 PROCEDURE — 82570 ASSAY OF URINE CREATININE: CPT | Mod: 90 | Performed by: UROLOGY

## 2019-12-16 PROCEDURE — 83945 ASSAY OF OXALATE: CPT | Mod: 90 | Performed by: UROLOGY

## 2019-12-16 PROCEDURE — 84540 ASSAY OF URINE/UREA-N: CPT | Mod: 90 | Performed by: UROLOGY

## 2019-12-16 PROCEDURE — 84560 ASSAY OF URINE/URIC ACID: CPT | Mod: 90 | Performed by: UROLOGY

## 2019-12-16 PROCEDURE — 83735 ASSAY OF MAGNESIUM: CPT | Mod: 90 | Performed by: UROLOGY

## 2019-12-16 PROCEDURE — 82507 ASSAY OF CITRATE: CPT | Mod: 90 | Performed by: UROLOGY

## 2019-12-16 PROCEDURE — 83935 ASSAY OF URINE OSMOLALITY: CPT | Mod: 90 | Performed by: UROLOGY

## 2019-12-16 PROCEDURE — 82340 ASSAY OF CALCIUM IN URINE: CPT | Mod: 90 | Performed by: UROLOGY

## 2019-12-16 PROCEDURE — 84133 ASSAY OF URINE POTASSIUM: CPT | Mod: 90 | Performed by: UROLOGY

## 2019-12-16 PROCEDURE — 82140 ASSAY OF AMMONIA: CPT | Mod: 90 | Performed by: UROLOGY

## 2019-12-16 PROCEDURE — 84392 ASSAY OF URINE SULFATE: CPT | Mod: 90 | Performed by: UROLOGY

## 2019-12-16 PROCEDURE — 84300 ASSAY OF URINE SODIUM: CPT | Mod: 90 | Performed by: UROLOGY

## 2019-12-16 PROCEDURE — 84105 ASSAY OF URINE PHOSPHORUS: CPT | Mod: 90 | Performed by: UROLOGY

## 2019-12-16 PROCEDURE — 99000 SPECIMEN HANDLING OFFICE-LAB: CPT | Performed by: UROLOGY

## 2019-12-16 PROCEDURE — 82436 ASSAY OF URINE CHLORIDE: CPT | Mod: 90 | Performed by: UROLOGY

## 2019-12-18 LAB
CA H2 PHOS DIHYD 24H SATFR UR: ABNORMAL DG
CHLORIDE 24H UR-SRATE: 130 MMOL/24 H (ref 40–224)
CITRATE 24H UR-SRATE: 794 MG/24 H (ref 378–1191)
CLINICAL BIOCHEMIST REVIEW: ABNORMAL
COLLECT DURATION TIME UR: 0.46 DG
COLLECT DURATION TIME UR: 24 H
CREAT 24H UR-MRATE: 1701 MG/24 H
OSMOLALITY UR: 364 MOSM/KG (ref 150–1150)
OXALATE 24H UR-MRATE: 26.4 MG/24 H (ref 9.7–40.5)
OXALATE 24H UR-SRATE: 0.3 MMOL/24 H (ref 0.11–0.46)
PH 24H UR: 5.6 [PH] (ref 4.5–8)
PHOSPHATE 24H UR-MRATE: 1161 MG/24 H
POTASSIUM 24H UR-SRATE: 65 MMOL/24 H (ref 17–77)
SODIUM 24H UR-SRATE: 154 MMOL/24 H (ref 41–227)
SPECIMEN VOL 24H UR: 2700 ML
SULFATE 24H UR-SRATE: 29 MMOL/24 H (ref 7–47)
SUPERSAT 24 AMMONIUM 24 HR UR: 50 MMOL/24 H (ref 15–56)
SUPERSAT 24 CALCIUM OXALATE CRYSTAL: ABNORMAL DG
SUPERSAT 24 CALCIUM URINE: 81 MG/24 H
SUPERSAT 24 HYDROXYAPATITE CRYSTAL: 0.08 DG
SUPERSAT 24 MAGNESIUM URINE: 108 MG/24 H (ref 51–269)
SUPERSAT 24 PROTEIN CATABOLIC RATE UR: 114 G/24 H (ref 56–125)
SUPERSAT 24 UREA NITROGEN UR: 14.3 G/24 H (ref 5–16)
URATE 24H SATFR UR: 2.47 DG
URATE 24H UR-MRATE: 891 MG/24 H

## 2020-01-23 ENCOUNTER — OFFICE VISIT (OUTPATIENT)
Dept: UROLOGY | Facility: OTHER | Age: 55
End: 2020-01-23
Payer: COMMERCIAL

## 2020-01-23 VITALS
OXYGEN SATURATION: 98 % | SYSTOLIC BLOOD PRESSURE: 135 MMHG | DIASTOLIC BLOOD PRESSURE: 86 MMHG | TEMPERATURE: 97.9 F | RESPIRATION RATE: 16 BRPM | HEART RATE: 68 BPM

## 2020-01-23 DIAGNOSIS — N20.0 RENAL CALCULI: Primary | ICD-10-CM

## 2020-01-23 PROCEDURE — 99213 OFFICE O/P EST LOW 20 MIN: CPT | Performed by: UROLOGY

## 2020-01-23 RX ORDER — OMEGA-3 FATTY ACIDS/FISH OIL 300-1000MG
400 CAPSULE ORAL EVERY 4 HOURS PRN
COMMUNITY

## 2020-01-23 ASSESSMENT — PAIN SCALES - GENERAL: PAINLEVEL: NO PAIN (0)

## 2020-01-23 NOTE — PROGRESS NOTES
Chief Complaint   Patient presents with     Results     urorisk       Nicolasa Zarate is a 54 year old female who presents today for follow up of   Chief Complaint   Patient presents with     Results     urorisk    f/u after recent metabolic evaluation for renal stones.  She is without any complaints.    Current Outpatient Medications   Medication Sig Dispense Refill     hydrochlorothiazide (HYDRODIURIL) 25 MG tablet Take 25 mg by mouth       ibuprofen (ADVIL/MOTRIN) 200 MG capsule Take 400 mg by mouth every 4 hours as needed for fever       levothyroxine (SYNTHROID/LEVOTHROID) 125 MCG tablet Take 125 mcg by mouth       valACYclovir (VALTREX) 1000 mg tablet TAKE ONE TABLET BY MOUTH TWICE DAILY FOR 3 DAYS FOR FLARES.       No Known Allergies   Past Medical History:   Diagnosis Date     Hypertension      Past Surgical History:   Procedure Laterality Date     COLONOSCOPY       LASER HOLMIUM LITHOTRIPSY URETER(S), INSERT STENT, COMBINED Left 12/2/2019    Procedure: LEFT CYSTOURETEROSCOPY, WITH LITHOTRIPSY USING LASER AND URETERAL STENT REMOVAL AND INSERTION;  Surgeon: Joel Avalos MD;  Location: [facility] OR     Family History   Problem Relation Age of Onset     Diabetes Mother      Cerebrovascular Disease Mother         hx of stroke     Hypertension Mother      Prostate Cancer Father      Endometrial Cancer Maternal Grandmother      Social History     Socioeconomic History     Marital status:      Spouse name: None     Number of children: None     Years of education: None     Highest education level: None   Occupational History     None   Social Needs     Financial resource strain: None     Food insecurity:     Worry: None     Inability: None     Transportation needs:     Medical: None     Non-medical: None   Tobacco Use     Smoking status: Never Smoker     Smokeless tobacco: Never Used   Substance and Sexual Activity     Alcohol use: Yes     Comment: socially     Drug use: Never     Sexual activity: None    Lifestyle     Physical activity:     Days per week: None     Minutes per session: None     Stress: None   Relationships     Social connections:     Talks on phone: None     Gets together: None     Attends Synagogue service: None     Active member of club or organization: None     Attends meetings of clubs or organizations: None     Relationship status: None     Intimate partner violence:     Fear of current or ex partner: None     Emotionally abused: None     Physically abused: None     Forced sexual activity: None   Other Topics Concern     None   Social History Narrative     None       REVIEW OF SYSTEMS  =================  C: NEGATIVE for fever, chills, change in weight  I: NEGATIVE for worrisome rashes, moles or lesions  E/M: NEGATIVE for ear, mouth and throat problems  R: NEGATIVE for significant cough or SHORTNESS OF BREATH  CV:  NEGATIVE for chest pain, palpitations or peripheral edema  GI: NEGATIVE for nausea, abdominal pain, heartburn, or change in bowel habits  NEURO: NEGATIVE numbness/weakness  : see HPI  PSYCH: NEGATIVE depression/anxiety  LYmph: no new enlarged lymph nodes  Ortho: no new trauma/movements    Physical Exam:  /86 (BP Location: Right arm, Patient Position: Sitting, Cuff Size: Adult Large)   Pulse 68   Temp 97.9  F (36.6  C) (Oral)   Resp 16   SpO2 98%    Patient is pleasant, in no acute distress, good general condition.  Lung: no evidence of respiratory distress    Abdomen: Soft, nondistended, non tender. No masses. No rebound or guarding.   Exam: no cva tenderness  Skin: Warm and dry.  No redness.  Psych: normal mood and affect  Neuro: alert and oriented  Musculaskeletal: moving all extremities  Exam Information     Exam Date Exam Time Accession # Results    12/16/19  6:00 AM [id number]    Component Value Flag Ref Range Units Status Collected Lab   Supersat 24 Collection Duration 24    h Corrected 12/16/2019  6:00 AM [facility] lab   Comment:   CORRECTED ON 12/18 AT 2324:  PREVIOUSLY REPORTED AS 24 hours   Supersat 24 Volume 2,700    mL Corrected 12/16/2019  6:00 AM [facility] lab   Comment:   CORRECTED ON 12/18 AT 2324: PREVIOUSLY REPORTED AS 2700   Supersat 24 Calcium Oxalate Crystal NEG 0.17   Reference Mean= 1.77 DG Final 12/16/2019  6:00 AM  [facility] lab   Comment:   (Note)   PDF Report available at:   [email or web address]    Supersat 24 Brushite Crystal NEG 2.90   Reference Mean= 0.21 DG Final 12/16/2019  6:00 AM [facility] lab   Supersat 24 Hydroxyapatite Crystal 0.08   Reference Mean= 3.96 DG Final 12/16/2019  6:00 AM  em lab   Supersat 24 Uric Acid Crystal 2.47  High   Reference Mean= 1.04 DG Final 12/16/2019  6:00 AM  [facility] lab   Supersat 24 Sodium Urate Crystal 0.46   Reference Mean= 1.76 DG Final 12/16/2019  6:00 AM  em lab   Supersat 24 Interpretation SEE NOTE     Final 12/16/2019  6:00 AM  [facility] lab   Comment:   (Note)   The DG is related to supersaturation. DG is negative for   undersaturated solutions, zero for solutions at the   solubility product, and positive for saturated solutions.   Any value greater than the Reference Mean is considered a   risk for the respective crystal type formation.    Supersat 24 Sodium Urine 154   41 - 227 mmol/24 h Final 12/16/2019  6:00 AM  em lab   Supersat 24 Potassium Urine 65   17 - 77 mmol/24 h Final 12/16/2019  6:00 AM [facility] lab   Supersat 24 Calcium Urine 81   <200 mg/24 h Final 12/16/2019  6:00 AM  [facility] lab   Comment:   (Note)   This test was developed and its performance characteristics   determined by [facility] in a manner consistent with   CLIA requirements. This test has not been cleared or   approved by the U.S. Food and Drug Administration.    Supersat 24 Magnesium Urine 108   51 - 269 mg/24 h Final 12/16/2019  6:00 AM  em lab   Comment:   (Note)   This test has been modified from the 's   instructions. Its performance characteristics were   determined by [facility] in a  manner consistent with   CLIA requirements. This test has not been cleared or   approved by the U.S. Food and Drug Administration.    Supersat 24 Chloride Urine 130   40 - 224 mmol/24 h Final 12/16/2019  6:00 AM  em lab   Supersat 24 Phosphorous Urine 1,161  High   <1,100 mg/24 h Final 12/16/2019  6:00 AM [facility] lab   Supersat 24 Sulfate Urine 29   7 - 47 mmol/24 h Final 12/16/2019  6:00 AM [facility] lab   Comment:   (Note)   This test was developed and its performance characteristics   determined by [facility] in a manner consistent with   CLIA requirements. This test has not been cleared or   approved by the U.S. Food and Drug Administration.    Supersat 24 Citrate Excretion Urine 794   378 - 1,191 mg/24 h Final 12/16/2019  6:00 AM [facility] lab   Comment:   (Note)   This test was developed and its performance characteristics   determined by [facility] in a manner consistent with   CLIA requirements. This test has not been cleared or   approved by the U.S. Food and Drug Administration.    Supersat 24 Oxalate U mmol/24 h 0.30   0.11 - 0.46 mmol/24 h Final 12/16/2019  6:00 AM [facility] lab   Comment:   (Note)   This test was developed and its performance characteristics   determined by [facility] in a manner consistent with   CLIA requirements. This test has not been cleared or   approved by the U.S. Food and Drug Administration.    Supersat 24 Oxalate mg/24 h 26.4   9.7 - 40.5 mg/24 h Final 12/16/2019  6:00 AM [facility] lab   Supersat 24 pH Ur 5.6   4.5 - 8.0  Final 12/16/2019  6:00 AM [facility] lab   Comment:   (Note)   This test was developed and its performance characteristics   determined by [facility] in a manner consistent with   CLIA requirements. This test has not been cleared or   approved by the U.S. Food and Drug Administration.    Supersat 24 Uric Acid Ur 891  High    mg/24 h Final 12/16/2019  6:00 AM [facility] lab   Comment:   (Note)   -- REFERENCE VALUE --   <750 (Diet-dependent)   This test has been modified  from the 's   instructions. Its performance characteristics were   determined by [facility] in a manner consistent with   CLIA requirements. This test has not been cleared or   approved by the U.S. Food and Drug Administration.    Supersat 24 Creatinine Ur 1,701  High    mg/24 h Final 12/16/2019  6:00 AM [facility] lab   Comment:   (Note)   -- REFERENCE VALUE --   The expected creatinine excretion   per 24 hrs for females:   601-1689 mg/24 hrs or   9-26 mg/kg/24 hrs.   Note: To convert to mg/kg of body weight/24 hrs,divide   the mg/24 h   result by the weight in kg.    Supersat 24 Osmolality Ur 364   150 - 1,150 mOsm/kg Final 12/16/2019  6:00 AM [facility] lab   Comment:   (Note)   This test was developed and its performance characteristics   determined by [facility] in a manner consistent with   CLIA requirements. This test has not been cleared or   approved by the U.S. Food and Drug Administration.    Supersat 24 Ammonium 24 Hr Ur 50   15 - 56 mmol/24 h Final 12/16/2019  6:00 AM [facility] lab   Comment:   (Note)   This test has been modified from the 's   instructions. Its performance characteristics were   determined by [facility] in a manner consistent with   CLIA requirements. This test has not been cleared or   approved by the U.S. Food and Drug Administration.    Supersat 24 Urea Nitrogen Ur 14.3   5.0 - 16.0 g/24 h Final 12/16/2019  6:00 AM [facility] lab   Supersat 24 Protein Catabolic Rate Ur 114   56 - 125 g/24 h Final 12/16/2019  6:00 AM [facility] lab   Comment:   (Note)   Test Performed by:   [place] [address]   : Calvin Maciel M.D. Ph.D.; CLIA# [id number]    Lab and Collection     Supersaturation Profile 24 Hour Urine (Order: [id number]) - 12/16/2019   Result History     Supersaturation Profile 24 Hour Urine (Order #[id number]) on 12/18/2019 - Order Result History Report   Supersaturation Profile 24 Hour  Urine [[id number]]     Electronically signed by: Joel Avalos MD on 12/11/19 1156 Status: Completed   Mode: Ordering in Verbal with readback mode Communicated by: Magalis Christopher, RN   Ordering user: Magalis Christopher RN 12/11/19 1154 Ordering provider: Joel Avalos MD   Ordered during: Office Visit on 12/11/2019   Order Providers     Authorizing Provider Encounter Provider   Joel Avalos [place] [facility] [place] Status:     This result is not viewable because no one can access it in [facility].          Result Notes for Supersaturation Profile 24 Hour Urine     Notes recorded by Elmira Alberts RN on 1/6/2020 at 11:09 AM CST  Letter sent 12/20  ------    Notes recorded by Joel Avalos MD on 1/6/2020 at 10:46 AM CST  Please CC patient of results.  ------    Notes recorded by Elmira Alberts RN on 12/20/2019 at 11:59 AM CST  Letter sent.   Elmira Alberts RN     ------    Notes recorded by Joel Avalos MD on 12/20/2019 at 11:08 AM CST  Please CC patient of results.  Appointment needed.          All Reviewers List     Joel Avalos MD on 1/6/2020 10:46 AM       Assessment/Plan:   (N20.0) Renal calculi  (primary encounter diagnosis)  Comment: high uric acid level found in urine.  This is related to high protein intake.  Plan: discussed decrease in protein intake

## 2021-07-11 ENCOUNTER — HEALTH MAINTENANCE LETTER (OUTPATIENT)
Age: 56
End: 2021-07-11

## 2021-09-05 ENCOUNTER — HEALTH MAINTENANCE LETTER (OUTPATIENT)
Age: 56
End: 2021-09-05

## 2022-08-07 ENCOUNTER — HEALTH MAINTENANCE LETTER (OUTPATIENT)
Age: 57
End: 2022-08-07

## 2022-10-23 ENCOUNTER — HEALTH MAINTENANCE LETTER (OUTPATIENT)
Age: 57
End: 2022-10-23

## 2023-09-02 ENCOUNTER — HEALTH MAINTENANCE LETTER (OUTPATIENT)
Age: 58
End: 2023-09-02

## (undated) DEVICE — SYR 30ML LL W/O NDL

## (undated) DEVICE — SPECIMEN CONTAINER 4OZ

## (undated) DEVICE — SOL WATER INJ 2000ML BAG 07118-07

## (undated) DEVICE — ESU GROUND PAD ADULT W/CORD E7507

## (undated) DEVICE — GLOVE PROTEXIS W/NEU-THERA 7.0  2D73TE70

## (undated) DEVICE — SOL WATER IRRIG 1000ML BOTTLE 07139-09

## (undated) DEVICE — KIT ENDO FIRST STEP DISINFECTANT 200ML W/POUCH EP-4

## (undated) DEVICE — Device

## (undated) DEVICE — GOWN XLG DISP 9545

## (undated) DEVICE — DRSG TELFA 2X3"

## (undated) RX ORDER — FENTANYL CITRATE 50 UG/ML
INJECTION, SOLUTION INTRAMUSCULAR; INTRAVENOUS
Status: DISPENSED
Start: 2019-12-02

## (undated) RX ORDER — OXYCODONE HYDROCHLORIDE 5 MG/1
TABLET ORAL
Status: DISPENSED
Start: 2019-12-02

## (undated) RX ORDER — CEFAZOLIN SODIUM 2 G/100ML
INJECTION, SOLUTION INTRAVENOUS
Status: DISPENSED
Start: 2019-12-02

## (undated) RX ORDER — PROPOFOL 10 MG/ML
INJECTION, EMULSION INTRAVENOUS
Status: DISPENSED
Start: 2019-12-02

## (undated) RX ORDER — GABAPENTIN 300 MG/1
CAPSULE ORAL
Status: DISPENSED
Start: 2019-12-02

## (undated) RX ORDER — ONDANSETRON 2 MG/ML
INJECTION INTRAMUSCULAR; INTRAVENOUS
Status: DISPENSED
Start: 2019-12-02

## (undated) RX ORDER — ACETAMINOPHEN 325 MG/1
TABLET ORAL
Status: DISPENSED
Start: 2019-12-02